# Patient Record
Sex: FEMALE | Race: OTHER
[De-identification: names, ages, dates, MRNs, and addresses within clinical notes are randomized per-mention and may not be internally consistent; named-entity substitution may affect disease eponyms.]

---

## 2020-07-28 ENCOUNTER — HOSPITAL ENCOUNTER (EMERGENCY)
Dept: HOSPITAL 7 - FB.ED | Age: 70
Discharge: HOME | End: 2020-07-28
Payer: SELF-PAY

## 2020-07-28 DIAGNOSIS — Z20.828: ICD-10-CM

## 2020-07-28 DIAGNOSIS — R10.84: Primary | ICD-10-CM

## 2020-07-28 PROCEDURE — U0002 COVID-19 LAB TEST NON-CDC: HCPCS

## 2020-07-28 RX ADMIN — LIDOCAINE HYDROCHLORIDE ONE ML: 20 JELLY TOPICAL at 20:52

## 2020-07-28 RX ADMIN — Medication PRN ML: at 17:29

## 2020-07-28 RX ADMIN — HYDROMORPHONE HYDROCHLORIDE STA MG: 2 INJECTION INTRAMUSCULAR; INTRAVENOUS; SUBCUTANEOUS at 18:52

## 2020-07-28 RX ADMIN — HYDROMORPHONE HYDROCHLORIDE ONE MG: 2 INJECTION INTRAMUSCULAR; INTRAVENOUS; SUBCUTANEOUS at 17:16

## 2020-07-28 RX ADMIN — ONDANSETRON ONE MG: 2 INJECTION, SOLUTION INTRAMUSCULAR; INTRAVENOUS at 17:19

## 2020-07-28 RX ADMIN — IOPAMIDOL ONE ML: 755 INJECTION, SOLUTION INTRAVENOUS at 18:50

## 2020-07-28 NOTE — CT
INDICATION:  Lower abdominal/pelvic pain.  



CT ABDOMEN AND PELVIS WITH CONTRAST:  Spiral 3.75 mm axial sections were 
obtained through the abdomen and pelvis with 100 mL Isovue-370 at 2 mL/second 
07/28/20 - no comparisons.  

Total exam DLP was 651.76 mGy-cm. 



Lower lung fields and pleural spaces visualized were unremarkable.  



There was question of some thickening of the gastric wall, could be on the basis
of gastritis, but should be correlated clinically as it was not fully distended.
 



The gallbladder is absent, compatible with history of its removal.  

The common bile duct is normal in caliber for a postcholecystectomy patient.  

The liver, spleen, adrenal glands, and pancreas appear to be normal.  



No retroperitoneal mass was identified.  



The kidneys appear abnormal with cortical scarring present bilaterally.  
Bilateral pigtail stents are noted in place, extending into a urostomy in this 
patient who is post bladder resection for bladder CA.  The urostomy exits in the
right lower quadrant anteriorly.  There is a hernia extending from the urostomy 
site and fat stranding is present in that hernia, raising question of an 
inflammatory process - possible infection in the hernia.  This should be 
correlated clinically. 



Additionally, there is a hernia including a loop of bowel at the pubic symphysis
anteriorly in the midline and extending to the right.  There appears to be a 
relative obstructive process due to this hernia with some impression on the loop
of bowel in the hernia.  The hernia sac opening is adjacent to and just cranial 
to the pubic symphysis.  It extends over the pubic symphysis and inferior to the
symphysis.  There is an air-fluid level within it.  The wall of the bowel loop 
was not grossly thickened.  



Metallic-appearing densities are noted in the bowel including in the left mid 
abdomen where what may represent intussusception is present, best imaged on 
coronal images 29-38, but also well visualized on axial images 42-50.  The 
intussusception does not appear to be obstructive.  



Degenerative disk disease is noted at L5-S1 and possible L3-4.  



No abdominal aortic aneurysm was identified.  There is some calcification in the
abdominal aorta.  



The uterus is absent, compatible with history of its removal. 



IMPRESSION:

1.  Fat stranding in a hernia at the right lower quadrant/pelvis at the site of 
urostomy.  Infection would be a possibility - correlate clinically.

2.  Midline and to the right hernia suprapubic including a loop of bowel with an
air-fluid level.  The possibility of a partially obstructive process of this 
small bowel loop is suggested.  A few other air-fluid levels are noted in the 
more proximal small bowel loops in the lower pelvis, which appears to be 
secondary to the partially obstructive process of this hernia.

3.  Possible intussusception in the left mid abdomen.

4.  Post bladder resection.

5.  Postcholecystectomy.

6.  Renal cortical scarring with urostomy tubes in place. 

7.  Degenerative disk disease L3-4 and more definitely at L5-S1.

8.  Posthysterectomy.



Report was called to Dr. Moya at 1845 hours.

[facility]
